# Patient Record
Sex: MALE | Race: WHITE | Employment: UNEMPLOYED | URBAN - NONMETROPOLITAN AREA
[De-identification: names, ages, dates, MRNs, and addresses within clinical notes are randomized per-mention and may not be internally consistent; named-entity substitution may affect disease eponyms.]

---

## 2017-04-28 PROBLEM — D17.0 LIPOMA OF SCALP: Status: ACTIVE | Noted: 2017-04-28

## 2018-07-04 ENCOUNTER — HOSPITAL ENCOUNTER (EMERGENCY)
Age: 38
Discharge: HOME | End: 2018-07-04
Payer: COMMERCIAL

## 2018-07-04 VITALS
RESPIRATION RATE: 16 BRPM | SYSTOLIC BLOOD PRESSURE: 121 MMHG | OXYGEN SATURATION: 97 % | WEIGHT: 72.8 LBS | TEMPERATURE: 98.3 F | DIASTOLIC BLOOD PRESSURE: 81 MMHG | HEART RATE: 93 BPM

## 2018-07-04 DIAGNOSIS — L03.011 PARONYCHIA OF FINGER OF RIGHT HAND: Primary | ICD-10-CM

## 2018-07-04 PROCEDURE — 10060 I&D ABSCESS SIMPLE/SINGLE: CPT

## 2018-07-04 PROCEDURE — 99282 EMERGENCY DEPT VISIT SF MDM: CPT

## 2018-07-04 RX ORDER — CEPHALEXIN 500 MG/1
500 CAPSULE ORAL 3 TIMES DAILY
Qty: 21 CAPSULE | Refills: 0 | Status: SHIPPED | OUTPATIENT
Start: 2018-07-04 | End: 2018-07-11

## 2018-07-04 NOTE — ED TRIAGE NOTES
Pt comes to the ED with a painful left index finger. Pt was biting his nails and thinks it may have gotten infected [place]. Pt is pain free now. Some swelling noted around the nailbed of the finger.

## 2018-07-07 ASSESSMENT — ENCOUNTER SYMPTOMS
SINUS PRESSURE: 0
NAUSEA: 0
EYE ITCHING: 0
COUGH: 0
SORE THROAT: 0
ABDOMINAL PAIN: 0
EYE DISCHARGE: 0
TROUBLE SWALLOWING: 0
WHEEZING: 0
SINUS PAIN: 0
SHORTNESS OF BREATH: 0
COLOR CHANGE: 0
CONSTIPATION: 0
ABDOMINAL DISTENTION: 0
EYE PAIN: 0

## 2018-07-07 NOTE — ED PROVIDER NOTES
allergic to penicillins. FAMILY HISTORY     indicated that his mother is alive. He indicated that his father is alive. family history is not on file. SOCIAL HISTORY      reports that he is a non-smoker but has been exposed to tobacco smoke. He does not have any smokeless tobacco history on file. PHYSICAL EXAM     INITIAL VITALS:  weight is 72 lb 12.8 oz (33 kg). His oral temperature is 98.3 °F (36.8 °C). His blood pressure is 121/81 and his pulse is 93. His respiration is 16 and oxygen saturation is 97%. Physical Exam   Constitutional: He is active. HENT:   Mouth/Throat: Mucous membranes are moist. Oropharynx is clear. Eyes: EOM are normal. Pupils are equal, round, and reactive to light. Neck: Normal range of motion. Cardiovascular: Normal rate and regular rhythm. Pulses are palpable. Pulmonary/Chest: Effort normal and breath sounds normal. There is normal air entry. Abdominal: Soft. Bowel sounds are normal.   Musculoskeletal: Normal range of motion. Left index finger, medial aspect, paronychia. Moderate sized. White. Neurological: He is alert. Skin: Skin is warm and dry. DIFFERENTIAL DIAGNOSIS:   Including but not limited to paronychia, felon, in grown nail    DIAGNOSTIC RESULTS     EKG: All EKG's are interpreted by the Emergency Department Physician who either signs or Co-signs this chart in the absence of a cardiologist.      RADIOLOGY: non-plain film images(s) such as CT, Ultrasound and MRI are read by the radiologist.  Plain radiographic images are visualized and preliminarily interpreted by the emergency physician unless otherwise stated below.   No orders to display         LABS:   Labs Reviewed - No data to display      [address] [place] [address] [place]:   Vitals:    Vitals:    07/04/18 0021   BP: 121/81   Pulse: 93   Resp: 16   Temp: 98.3 °F (36.8 °C)   TempSrc: Oral   SpO2: 97%   Weight: 72 lb 12.8 oz (33 kg)         Pertinent Labs & AM        PATIENT REFERRED TO:  Afton CHIKI Britt CNP  Towanda DOROTHY Mckeon  [phone]    Schedule an appointment as soon as possible for a visit in 2 days  For wound re-check      DISCHARGE MEDICATIONS:  Discharge Medication List as of 7/4/2018  1:16 AM      START taking these medications    Details   cephALEXin (KEFLEX) 500 MG capsule Take 1 capsule by mouth 3 times daily for 7 days, Disp-21 capsule, R-0Print             (Please note that portions of this note were completed with a voice recognition program.  Efforts were made to edit the dictations but occasionally words are mis-transcribed.)    CHIKI Ramirez CNP, APRN - CNP  07/07/18 4540

## 2025-06-03 ENCOUNTER — OFFICE VISIT (OUTPATIENT)
Age: 45
End: 2025-06-03
Payer: COMMERCIAL

## 2025-06-03 ENCOUNTER — TELEPHONE (OUTPATIENT)
Age: 45
End: 2025-06-03

## 2025-06-03 VITALS
SYSTOLIC BLOOD PRESSURE: 148 MMHG | DIASTOLIC BLOOD PRESSURE: 98 MMHG | OXYGEN SATURATION: 98 % | WEIGHT: 184.6 LBS | BODY MASS INDEX: 29.35 KG/M2 | HEART RATE: 87 BPM

## 2025-06-03 DIAGNOSIS — G44.82 COITAL HEADACHE: ICD-10-CM

## 2025-06-03 DIAGNOSIS — R56.9 FOCAL SEIZURE (HCC): ICD-10-CM

## 2025-06-03 DIAGNOSIS — R41.9 COGNITIVE COMPLAINTS: Primary | ICD-10-CM

## 2025-06-03 DIAGNOSIS — G43.009 MIGRAINE WITHOUT AURA, NOT INTRACTABLE, WITHOUT STATUS MIGRAINOSUS: ICD-10-CM

## 2025-06-03 PROCEDURE — 4004F PT TOBACCO SCREEN RCVD TLK: CPT | Performed by: PSYCHIATRY & NEUROLOGY

## 2025-06-03 PROCEDURE — 99205 OFFICE O/P NEW HI 60 MIN: CPT | Performed by: PSYCHIATRY & NEUROLOGY

## 2025-06-03 PROCEDURE — G8419 CALC BMI OUT NRM PARAM NOF/U: HCPCS | Performed by: PSYCHIATRY & NEUROLOGY

## 2025-06-03 PROCEDURE — G8427 DOCREV CUR MEDS BY ELIG CLIN: HCPCS | Performed by: PSYCHIATRY & NEUROLOGY

## 2025-06-03 RX ORDER — INDOMETHACIN 25 MG/1
CAPSULE ORAL
Qty: 30 CAPSULE | Refills: 1 | Status: SHIPPED | OUTPATIENT
Start: 2025-06-03 | End: 2025-06-03

## 2025-06-03 RX ORDER — TRIAMCINOLONE ACETONIDE 1 MG/G
50 CREAM TOPICAL DAILY
COMMUNITY
Start: 2025-04-16

## 2025-06-03 RX ORDER — FAMOTIDINE 20 MG/1
20 TABLET, FILM COATED ORAL 2 TIMES DAILY PRN
COMMUNITY
Start: 2025-05-15

## 2025-06-03 RX ORDER — INDOMETHACIN 25 MG/1
CAPSULE ORAL
Qty: 30 CAPSULE | Refills: 1 | Status: SHIPPED | OUTPATIENT
Start: 2025-06-03

## 2025-06-03 NOTE — TELEPHONE ENCOUNTER
Left msg on confidential line with nurse  at [facility] to request most recent lab work results for pt. Also faxed request to [facility] fax# [phone], ph# [phone] to request MRI brain around 2015 per Dr. Jones.

## 2025-06-03 NOTE — PROGRESS NOTES
articulation, and no dysphasia/aphasia  Cranial Nerves   CN II Right: visual fields appear intact   CN II Left: visual fields appear intact   CN III, IV, VI: EOM no nystagmus, normal pursuit, and extraocular muscle strength normal   CN III: pupil normal size, pupil reactive to light and dark, pupil accomodates, and no ptosis   CN IV: normal   CN VI: normal   CN V Right: normal sensation and muscles of mastication intact   CN V Left: normal sensation and muscles of mastication intact   CN VII Right: normal facial expression   CN VII Left: normal facial expression   CN VIII Right: hearing in tact to normal conversation   CN VIII Left: hearing in tact to normal conversation   CN IX,X: normal palatal movement   CN XI Right: normal sternocleidomastoid and normal trapezius   CN XI Left: normal sternocleidomastoid and normal trapezius   CN XII: no tremors of the tongue, no fasciculation of the tongue, tongue protrudes midline, normal power to left, and normal power to right  Gait and Stance   Gait/Posture: station normal, ambulates independently, gait normal, and Romberg's test normal  Motor/Coordination Exam   General: no bradykinesia, no tremors, no chorea, no athetosis, no myoclonus, and no dyskinesia   Right Upper Extremity: normal motor strength, normal bulk, and normal tone   Left Upper Extremity: normal motor strength, normal bulk, and normal tone   Right Lower Extremity: normal motor strength, normal bulk, and normal tone   Left Lower Extremity: normal motor strength, normal bulk, and normal tone   Coordination: no drift, normal finger-to-nose, and rapid alternating movements normal  Reflexes   Reflexes Right: biceps 1/4, triceps 1/4, brachioradialis 1/4, and DTRs lower extremities non-reactive   Reflexes Left: biceps 1/4, triceps 1/4, brachioradialis 1/4, and DTRs lower extremities non-reactive   Plantar Reflex Right: response downgoing   Plantar Reflex Left: response downgoing   Hoffmans Reflex Right:

## 2025-06-10 ENCOUNTER — HOSPITAL ENCOUNTER (OUTPATIENT)
Dept: SLEEP CENTER | Age: 45
Discharge: HOME | End: 2025-06-10
Attending: PSYCHIATRY & NEUROLOGY
Payer: COMMERCIAL

## 2025-06-10 DIAGNOSIS — R56.9 FOCAL SEIZURE (HCC): ICD-10-CM

## 2025-06-10 PROCEDURE — 95819 EEG AWAKE AND ASLEEP: CPT

## 2025-06-10 PROCEDURE — 95816 EEG AWAKE AND DROWSY: CPT | Performed by: STUDENT IN AN ORGANIZED HEALTH CARE EDUCATION/TRAINING PROGRAM

## 2025-06-10 NOTE — PROGRESS NOTES
Routine outpatient EEG completed and awaiting interpretation.       Electronically signed by Heidi Weber on 6/10/2025 at 3:50 PM

## 2025-06-12 ENCOUNTER — TELEPHONE (OUTPATIENT)
Age: 45
End: 2025-06-12

## 2025-06-12 NOTE — PROCEDURES
ROUTINE ELECTROENCEPHALOGRAM    Identifying Information:  Name: Gary Rose  MRN: [id number]  : 1980  Interpreting Physician: Moriah Greene DO  Referring Provider: Farshad Jones DO  Date of EE25  Procedure Location: Inpatient     Clinical History:  Gary Rose is a 45 y.o. male with concerns for seizure like activity.     Current Medications:     Current Outpatient Medications   Medication Instructions    Clomid 50 mg, DAILY    famotidine (PEPCID) 20 mg, 2 TIMES DAILY PRN    indomethacin (INDOCIN) 25 MG capsule Take 1 tablet 30-60 minutes before sexual intercourse    naproxen (NAPROSYN) 500 mg, EVERY 6 HOURS PRN    SUMAtriptan (IMITREX) 100 mg, ONCE PRN    varenicline (CHANTIX) 0.5 mg, 2 TIMES DAILY         Indication:  Rule out seizure/seizure disorder     Technical Summary:  28 channels of EEG were recorded in a digital format on a patient who was reported to be awake and drowsy during the recording. The patient was not sleep deprived prior to the EEG.     The PDR consisted of well-developed, well-regulated 10-11 Hz alpha activity, maximal over the posterior head regions and reactive to eye opening and closure.     Photic stimulation was performed and did not produce any abnormalities. During the recording stage II sleep was not seen, but drowsiness did occur.     The EKG lead revealed no rhythm abnormalities.       EEG Interpretation:   This EEG was within normal limits for a patient of this age in the awake and drowsy states. No focal, lateralizing, or epileptiform features were seen during the recording.       Clinical correlation is recommended.    Moriah Greene DO  Epileptologist  2025 7:56 AM

## 2025-08-01 DIAGNOSIS — G40.89 OTHER SEIZURES (HCC): Primary | ICD-10-CM

## 2025-08-18 ENCOUNTER — TELEPHONE (OUTPATIENT)
Age: 45
End: 2025-08-18

## 2025-09-02 ENCOUNTER — OFFICE VISIT (OUTPATIENT)
Age: 45
End: 2025-09-02
Payer: COMMERCIAL

## 2025-09-02 VITALS
SYSTOLIC BLOOD PRESSURE: 134 MMHG | HEART RATE: 87 BPM | WEIGHT: 183 LBS | BODY MASS INDEX: 29.09 KG/M2 | DIASTOLIC BLOOD PRESSURE: 84 MMHG | OXYGEN SATURATION: 97 %

## 2025-09-02 DIAGNOSIS — G44.82 COITAL HEADACHE: ICD-10-CM

## 2025-09-02 DIAGNOSIS — G43.009 MIGRAINE WITHOUT AURA, NOT INTRACTABLE, WITHOUT STATUS MIGRAINOSUS: ICD-10-CM

## 2025-09-02 DIAGNOSIS — R56.9 FOCAL SEIZURE (HCC): ICD-10-CM

## 2025-09-02 DIAGNOSIS — R41.9 COGNITIVE COMPLAINTS: Primary | ICD-10-CM

## 2025-09-02 PROCEDURE — 99214 OFFICE O/P EST MOD 30 MIN: CPT | Performed by: NURSE PRACTITIONER

## 2025-09-02 RX ORDER — INDOMETHACIN 25 MG/1
CAPSULE ORAL
Qty: 30 CAPSULE | Refills: 1 | Status: SHIPPED | OUTPATIENT
Start: 2025-09-02